# Patient Record
Sex: FEMALE | Race: WHITE | NOT HISPANIC OR LATINO | Employment: UNEMPLOYED | URBAN - METROPOLITAN AREA
[De-identification: names, ages, dates, MRNs, and addresses within clinical notes are randomized per-mention and may not be internally consistent; named-entity substitution may affect disease eponyms.]

---

## 2018-06-28 ENCOUNTER — PREP FOR SURGERY (OUTPATIENT)
Dept: GYNECOLOGIC ONCOLOGY | Facility: CLINIC | Age: 48
End: 2018-06-28

## 2018-06-28 ENCOUNTER — OFFICE VISIT (OUTPATIENT)
Dept: GYNECOLOGIC ONCOLOGY | Facility: CLINIC | Age: 48
End: 2018-06-28

## 2018-06-28 VITALS
HEART RATE: 96 BPM | TEMPERATURE: 99.2 F | WEIGHT: 150 LBS | BODY MASS INDEX: 29.45 KG/M2 | HEIGHT: 60 IN | DIASTOLIC BLOOD PRESSURE: 78 MMHG | RESPIRATION RATE: 14 BRPM | SYSTOLIC BLOOD PRESSURE: 130 MMHG | OXYGEN SATURATION: 97 %

## 2018-06-28 DIAGNOSIS — N90.1 VIN II (VULVAR INTRAEPITHELIAL NEOPLASIA II): Primary | ICD-10-CM

## 2018-06-28 DIAGNOSIS — R87.622 LGSIL PAP SMEAR OF VAGINA: ICD-10-CM

## 2018-06-28 DIAGNOSIS — Z72.0 TOBACCO ABUSE: ICD-10-CM

## 2018-06-28 PROCEDURE — 99205 OFFICE O/P NEW HI 60 MIN: CPT | Performed by: OBSTETRICS & GYNECOLOGY

## 2018-06-28 RX ORDER — BUSPIRONE HYDROCHLORIDE 7.5 MG/1
TABLET ORAL
COMMUNITY
Start: 2018-06-19

## 2018-06-28 RX ORDER — VENLAFAXINE HYDROCHLORIDE 75 MG/1
CAPSULE, EXTENDED RELEASE ORAL
COMMUNITY
Start: 2018-05-14

## 2018-06-28 RX ORDER — MONTELUKAST SODIUM 10 MG/1
TABLET ORAL
COMMUNITY
Start: 2018-06-19

## 2018-07-01 PROBLEM — R87.622 LGSIL PAP SMEAR OF VAGINA: Status: ACTIVE | Noted: 2018-07-01

## 2018-07-01 PROBLEM — Z72.0 TOBACCO ABUSE: Status: ACTIVE | Noted: 2018-07-01

## 2018-07-02 ENCOUNTER — PATIENT EDUCATION (SURGERY INSTRUCTIONS) (OUTPATIENT)
Dept: GYNECOLOGIC ONCOLOGY | Facility: CLINIC | Age: 48
End: 2018-07-02

## 2018-07-02 ENCOUNTER — TRANSCRIBE ORDERS (OUTPATIENT)
Dept: ADMINISTRATIVE | Facility: HOSPITAL | Age: 48
End: 2018-07-02

## 2018-07-02 NOTE — PATIENT INSTRUCTIONS
Outpatient Pre-op Patient Education  *See checked boxes for your instructions*    Jaelyn Emanuel  [id number]  1970    SURGEON: Dr. Montilla    Appointment  [x]  1. Your surgery has been scheduled on 7-17-18 at [facility] [place] located at [address]. You will need to be there at 12:30 PM.   [x] 2.  You have pre-admission testing (PAT) appointment on 7-16-18 at 12:45 PM.  You will need to be at hospital registration 10 minutes before that time.     [x] 3.  The registration department is located in the long hallway between the [address].       The Day(s) Before Surgery  [x] 1.  Do not drink alcohol or smoke.     [x] 2.  Do not take vitamins or aspirin one week before surgery.       [x] 3.  If you are ill on the days leading up to your surgery, please call our office.      [x] 4.  If you are using medications for diabetes, call the physician who manages these and get instructions on how they should be taken before and after surgery.    [x] 5.  Nothing to eat or drink after midnight on 7-16-18.      [x] 6.  Please make prior arrangements for someone to drive you home after your procedure        The Day of Surgery  [x] 1.  Do not eat, drink, or chew gum.     [x] 2.  On the morning of your surgery, you may take her prescription medications with a sip of water. Bring all medication with you to the surgery center. (Diabetic patients should bring insulin if instructed to do so by their diabetes managing physician).   [x] 3. Bathe or shower the morning of your surgery. Do not use powders, lotions, or creams. Deodorants are okay.     [x] 4. Wear loose, comfortable clothing.     [x] 5. Bring holders for glasses, contacts, or dentures.      [x] 6. Bring any required payment and forms, including insurance cards. Leave all money and valuables at home.        Post-surgery Instructions  [x] 1.  For the first 24 hours, rest and take periodic deep breaths to remove anesthetic agents  from your body.   [x] 2.  Follow any specific instructions relevant to your particular surgery.     [x] 3.  Limit activity to avoid stress to the surgical site.      [x] 4.  Keep all dressings dry. Shower or bathe as instructed by your doctor.     [x] 5.  Drink and eat light foods. Remain on liquids alone only if nausea and vomiting occur. Return to your regular diet gradually, as tolerance allows.    [x] 6. Avoid alcohol for at least 24 hours.      [x] 7.  Take prescription pain medication as directed and with food.      [x] 8.  Call our office after discharge from the surgery center to make your post-op appointment.        In Case of Emergency:  Call our office if you experience any of the following:  - Excessive drainage, bleeding, swelling, or redness at the incision site  - Severe pain not eased by pain medication  - Temperature above 101  - Persistent nausea or vomiting  - Skin rash or general body itching

## 2018-07-09 ENCOUNTER — PATIENT EDUCATION (SURGERY INSTRUCTIONS) (OUTPATIENT)
Dept: GYNECOLOGIC ONCOLOGY | Facility: CLINIC | Age: 48
End: 2018-07-09

## 2018-07-09 ENCOUNTER — TELEPHONE (OUTPATIENT)
Dept: GYNECOLOGIC ONCOLOGY | Facility: CLINIC | Age: 48
End: 2018-07-09

## 2018-07-09 NOTE — TELEPHONE ENCOUNTER
Phoned pt to inform her Dr. Montilla not able to do her outpt surgery scheduled for 7-17-18, need to change date, left message to call.

## 2018-07-09 NOTE — PATIENT INSTRUCTIONS
Outpatient Pre-op Patient Education  *See checked boxes for your instructions*    Jaelyn Emanuel  [id number]  1970    SURGEON: Dr. Montilla    Appointment  [x]  1. Your surgery has been scheduled on 7-16-18 at [facility] located at [address] [facility]. You will need to be there at 9:00 AM.   [x] 2.  You have pre-admission testing (PAT) appointment on 7-15-18 at 4:45 PM.  You will need to be at hospital registration 10 minutes before that time.     [x] 3.  The registration department is located in the long hallway between the [address].       The Day(s) Before Surgery  [x] 1.  Do not drink alcohol or smoke.     [x] 2.  Do not take vitamins or aspirin one week before surgery.       [x] 3.  If you are ill on the days leading up to your surgery, please call our office.      [x] 4.  If you are using medications for diabetes, call the physician who manages these and get instructions on how they should be taken before and after surgery.    [x] 5.  Nothing to eat or drink after midnight on 7-15-18.      [x] 6.  Please make prior arrangements for someone to drive you home after your procedure        The Day of Surgery  [x] 1.  Do not eat, drink, or chew gum.     [x] 2.  On the morning of your surgery, you may take her prescription medications with a sip of water. Bring all medication with you to the surgery center. (Diabetic patients should bring insulin if instructed to do so by their diabetes managing physician).   [x] 3. Bathe or shower the morning of your surgery. Do not use powders, lotions, or creams. Deodorants are okay.     [x] 4. Wear loose, comfortable clothing.     [x] 5. Bring holders for glasses, contacts, or dentures.      [x] 6. Bring any required payment and forms, including insurance cards. Leave all money and valuables at home.        Post-surgery Instructions  [x] 1.  For the first 24 hours, rest and take periodic deep breaths to remove anesthetic agents  from your body.   [x] 2.  Follow any specific instructions relevant to your particular surgery.     [x] 3.  Limit activity to avoid stress to the surgical site.      [x] 4.  Keep all dressings dry. Shower or bathe as instructed by your doctor.     [x] 5.  Drink and eat light foods. Remain on liquids alone only if nausea and vomiting occur. Return to your regular diet gradually, as tolerance allows.    [x] 6. Avoid alcohol for at least 24 hours.      [x] 7.  Take prescription pain medication as directed and with food.      [x] 8.  Call our office after discharge from the surgery center to make your post-op appointment.        In Case of Emergency:  Call our office if you experience any of the following:  - Excessive drainage, bleeding, swelling, or redness at the incision site  - Severe pain not eased by pain medication  - Temperature above 101  - Persistent nausea or vomiting  - Skin rash or general body itching

## 2018-07-09 NOTE — TELEPHONE ENCOUNTER
Received call from Hortensia with [facility] surgery scheduling, states can follow on 7-16-18, pt's case moved to 7-16-18 to follow.

## 2018-07-10 ENCOUNTER — TELEPHONE (OUTPATIENT)
Dept: GYNECOLOGIC ONCOLOGY | Facility: CLINIC | Age: 48
End: 2018-07-10

## 2018-07-10 NOTE — TELEPHONE ENCOUNTER
Orders faxed successfully to [facility] for pt's upcoming outpt procedure 7-16-18 with Dr. Montilla.

## 2018-07-15 ENCOUNTER — HOSPITAL ENCOUNTER (OUTPATIENT)
Dept: GENERAL RADIOLOGY | Facility: HOSPITAL | Age: 48
Discharge: HOME | End: 2018-07-15
Admitting: OBSTETRICS & GYNECOLOGY

## 2018-07-15 ENCOUNTER — APPOINTMENT (OUTPATIENT)
Dept: PREADMISSION TESTING | Facility: HOSPITAL | Age: 48
End: 2018-07-15

## 2018-07-15 DIAGNOSIS — N90.1 VIN II (VULVAR INTRAEPITHELIAL NEOPLASIA II): ICD-10-CM

## 2018-07-15 LAB
ALBUMIN SERPL-MCNC: 4.4 G/DL (ref 3.2–4.8)
ALBUMIN/GLOB SERPL: 1.7 G/DL (ref 1.5–2.5)
ALP SERPL-CCNC: 90 U/L (ref 25–100)
ALT SERPL W P-5'-P-CCNC: 14 U/L (ref 7–40)
ANION GAP SERPL CALCULATED.3IONS-SCNC: 7 MMOL/L (ref 3–11)
AST SERPL-CCNC: 15 U/L (ref 0–33)
BASOPHILS # BLD AUTO: 0.03 10*3/MM3 (ref 0–0.2)
BASOPHILS NFR BLD AUTO: 0.3 % (ref 0–1)
BILIRUB SERPL-MCNC: 0.9 MG/DL (ref 0.3–1.2)
BUN BLD-MCNC: 10 MG/DL (ref 9–23)
BUN/CREAT SERPL: 13.7 (ref 7–25)
CALCIUM SPEC-SCNC: 8.9 MG/DL (ref 8.7–10.4)
CHLORIDE SERPL-SCNC: 111 MMOL/L (ref 99–109)
CO2 SERPL-SCNC: 24 MMOL/L (ref 20–31)
CREAT BLD-MCNC: 0.73 MG/DL (ref 0.6–1.3)
DEPRECATED RDW RBC AUTO: 42.2 FL (ref 37–54)
EOSINOPHIL # BLD AUTO: 0.17 10*3/MM3 (ref 0–0.3)
EOSINOPHIL NFR BLD AUTO: 1.8 % (ref 0–3)
ERYTHROCYTE [DISTWIDTH] IN BLOOD BY AUTOMATED COUNT: 13.1 % (ref 11.3–14.5)
GFR SERPL CREATININE-BSD FRML MDRD: 85 ML/MIN/1.73
GLOBULIN UR ELPH-MCNC: 2.6 GM/DL
GLUCOSE BLD-MCNC: 97 MG/DL (ref 70–100)
HCG INTACT+B SERPL-ACNC: <5 MIU/ML
HCT VFR BLD AUTO: 40.6 % (ref 34.5–44)
HGB BLD-MCNC: 13.5 G/DL (ref 11.5–15.5)
IMM GRANULOCYTES # BLD: 0.02 10*3/MM3 (ref 0–0.03)
IMM GRANULOCYTES NFR BLD: 0.2 % (ref 0–0.6)
LYMPHOCYTES # BLD AUTO: 3.17 10*3/MM3 (ref 0.6–4.8)
LYMPHOCYTES NFR BLD AUTO: 33 % (ref 24–44)
MCH RBC QN AUTO: 29.3 PG (ref 27–31)
MCHC RBC AUTO-ENTMCNC: 33.3 G/DL (ref 32–36)
MCV RBC AUTO: 88.1 FL (ref 80–99)
MONOCYTES # BLD AUTO: 0.75 10*3/MM3 (ref 0–1)
MONOCYTES NFR BLD AUTO: 7.8 % (ref 0–12)
NEUTROPHILS # BLD AUTO: 5.46 10*3/MM3 (ref 1.5–8.3)
NEUTROPHILS NFR BLD AUTO: 56.9 % (ref 41–71)
PLATELET # BLD AUTO: 301 10*3/MM3 (ref 150–450)
PMV BLD AUTO: 9.4 FL (ref 6–12)
POTASSIUM BLD-SCNC: 4.1 MMOL/L (ref 3.5–5.5)
PROT SERPL-MCNC: 7 G/DL (ref 5.7–8.2)
RBC # BLD AUTO: 4.61 10*6/MM3 (ref 3.89–5.14)
SODIUM BLD-SCNC: 142 MMOL/L (ref 132–146)
WBC NRBC COR # BLD: 9.6 10*3/MM3 (ref 3.5–10.8)

## 2018-07-15 PROCEDURE — 85025 COMPLETE CBC W/AUTO DIFF WBC: CPT | Performed by: OBSTETRICS & GYNECOLOGY

## 2018-07-15 PROCEDURE — 71046 X-RAY EXAM CHEST 2 VIEWS: CPT

## 2018-07-15 PROCEDURE — 36415 COLL VENOUS BLD VENIPUNCTURE: CPT

## 2018-07-15 PROCEDURE — 93005 ELECTROCARDIOGRAM TRACING: CPT

## 2018-07-15 PROCEDURE — 84702 CHORIONIC GONADOTROPIN TEST: CPT | Performed by: OBSTETRICS & GYNECOLOGY

## 2018-07-15 PROCEDURE — 80053 COMPREHEN METABOLIC PANEL: CPT | Performed by: OBSTETRICS & GYNECOLOGY

## 2018-07-15 PROCEDURE — 93010 ELECTROCARDIOGRAM REPORT: CPT | Performed by: INTERNAL MEDICINE

## 2018-07-16 ENCOUNTER — APPOINTMENT (OUTPATIENT)
Dept: PREADMISSION TESTING | Facility: HOSPITAL | Age: 48
End: 2018-07-16

## 2018-07-16 ENCOUNTER — OUTSIDE FACILITY SERVICE (OUTPATIENT)
Dept: GYNECOLOGIC ONCOLOGY | Facility: CLINIC | Age: 48
End: 2018-07-16

## 2018-07-16 ENCOUNTER — LAB REQUISITION (OUTPATIENT)
Dept: LAB | Facility: HOSPITAL | Age: 48
End: 2018-07-16

## 2018-07-16 DIAGNOSIS — N90.1 MODERATE VULVAR DYSPLASIA: ICD-10-CM

## 2018-07-16 PROCEDURE — 56620 VULVECTOMY SIMPLE PARTIAL: CPT | Performed by: OBSTETRICS & GYNECOLOGY

## 2018-07-16 PROCEDURE — 88309 TISSUE EXAM BY PATHOLOGIST: CPT | Performed by: OBSTETRICS & GYNECOLOGY

## 2018-07-16 PROCEDURE — 88305 TISSUE EXAM BY PATHOLOGIST: CPT | Performed by: OBSTETRICS & GYNECOLOGY

## 2018-07-18 LAB
CYTO UR: NORMAL
LAB AP CASE REPORT: NORMAL
LAB AP CLINICAL INFORMATION: NORMAL
PATH REPORT.FINAL DX SPEC: NORMAL
PATH REPORT.GROSS SPEC: NORMAL

## 2018-07-25 ENCOUNTER — OFFICE VISIT (OUTPATIENT)
Dept: GYNECOLOGIC ONCOLOGY | Facility: CLINIC | Age: 48
End: 2018-07-25

## 2018-07-25 VITALS
OXYGEN SATURATION: 96 % | BODY MASS INDEX: 29.29 KG/M2 | DIASTOLIC BLOOD PRESSURE: 73 MMHG | SYSTOLIC BLOOD PRESSURE: 125 MMHG | HEART RATE: 84 BPM | RESPIRATION RATE: 12 BRPM | WEIGHT: 150 LBS | TEMPERATURE: 97.6 F

## 2018-07-25 DIAGNOSIS — Z98.890 POST-OPERATIVE STATE: Primary | ICD-10-CM

## 2018-07-25 PROCEDURE — 99024 POSTOP FOLLOW-UP VISIT: CPT | Performed by: OBSTETRICS & GYNECOLOGY

## 2018-07-25 RX ORDER — DOCUSATE SODIUM 250 MG
250 CAPSULE ORAL DAILY
Qty: 60 CAPSULE | Refills: 2 | Status: SHIPPED | OUTPATIENT
Start: 2018-07-25

## 2018-07-25 NOTE — PROGRESS NOTES
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 woman who presents for a post operative evaluation status post wide local excision of vulva and biopsies performed on 7/16/18      Surgery and hospital course were uncomplicated.  Today, patient notes normal bowel and bladder function.  She continues to have pain with her incision and states that she has not used interdry because she was unsure how to use it.     Past Medical History, Past Surgical History, Social History, Family History have been reviewed and are without significant changes except as mentioned.    Review of Systems   All other systems were reviewed and are negative except as mentioned above.    Medications:  The current medication list was reviewed in the EMR    ALLERGIES:  No Known Allergies        /73   Pulse 84   Temp 97.6 °F (36.4 °C) (Temporal Artery )   Resp 12   Wt 68 kg (150 lb)   SpO2 96%   BMI 29.29 kg/m²        Physical Exam  Constitutional:  Patient is a pleasant woman in no acute distress.  Gastrointestinal: Abdomen is soft and appropriately tender.  There is no mass palpated.  There is no rebound or guarding.  Extremities:  Bilateral lower extremities are non-tender.  Gynecologic: Deep sutures visible on perineum, superficial skin separation over posterior vulva. Moderate amount of exudate noted.     PATHOLOGY:  1. LEFT ANTERIOR VULVAR PIGMENTED LESION, BIOPSY:  Benign lentigo.   2. LEFT ANTERIOR VULVAR PERICLITORAL BIOPSY:  Changes suggestive of HPV.   Negative for dysplasia and neoplasm.  3. 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  Margins free of dysplasia.   Focal benign squamous inclusion cyst.        ASSESSMENT/PLAN:  Jaelyn Emanuel returns for a post-operative evaluation today.  All pathology reports were given to patient. We  discussed exam findings and I reviewed postoperative care of her incisions including using interdry. She has not been performing wound care as instructed.      Overall, the patient is very pleased with her care.  I recommended continuation of post operative precautions as discussed.     She is to follow up in 1 week to reevaluate incision.     Note: Speech recognition transcription software was used to dictate portions of this document.  An attempt at proofreading has been made though minor errors in transcription may still be present.  Please do not hesitate to call our office with any questions.    Patient was seen and examined with Dr. Gardner,  resident, who performed portions of the examination and documentation for this patient's care under my direct supervision.    Jane Montilla MD  07/28/18  8:49 AM

## 2018-08-01 ENCOUNTER — OFFICE VISIT (OUTPATIENT)
Dept: GYNECOLOGIC ONCOLOGY | Facility: CLINIC | Age: 48
End: 2018-08-01

## 2018-08-01 VITALS
SYSTOLIC BLOOD PRESSURE: 123 MMHG | OXYGEN SATURATION: 96 % | HEART RATE: 91 BPM | TEMPERATURE: 98.9 F | DIASTOLIC BLOOD PRESSURE: 77 MMHG | BODY MASS INDEX: 29.1 KG/M2 | WEIGHT: 149 LBS | RESPIRATION RATE: 16 BRPM

## 2018-08-01 DIAGNOSIS — Z98.890 POST-OPERATIVE STATE: Primary | ICD-10-CM

## 2018-08-01 PROCEDURE — 99024 POSTOP FOLLOW-UP VISIT: CPT | Performed by: OBSTETRICS & GYNECOLOGY

## 2018-08-02 NOTE — PROGRESS NOTES
Jaelyn Emanuel  [id number]  1970      Reason for Visit:  Vulvar HSIL, Postoperative evaluation, skin dehiscence     History of Present Illness:  Patient is a very pleasant 47 y.o. woman who presents for continued post operative evaluation status post wide local excision of vulva and biopsies performed on 7/16/18      After last visit, pt states that she has been using interdry and it helps with drainage at night, though she continues to have drainage during the day. She does endorse feeling significant irritation. She thinks she is low on interdry.      Past Medical History, Past Surgical History, Social History, Family History have been reviewed and are without significant changes except as mentioned.    Review of Systems   All other systems were reviewed and are negative except as mentioned above.    Medications:  The current medication list was reviewed in the EMR    ALLERGIES:  No Known Allergies        /77   Pulse 91   Temp 98.9 °F (37.2 °C) (Temporal Artery )   Resp 16   Wt 67.6 kg (149 lb)   SpO2 96%   BMI 29.10 kg/m²        Physical Exam  Constitutional:  Patient is a pleasant woman in no acute distress.  Gastrointestinal: Abdomen is soft and appropriately tender.  There is no mass palpated.  There is no rebound or guarding.  Extremities:  Bilateral lower extremities are non-tender.  Gynecologic: Deep sutures visible on perineum, superficial skin separation over posterior vulva and left labia. Moderate amount of exudate noted. Exposed sutures removed.     PATHOLOGY:  1. LEFT ANTERIOR VULVAR PIGMENTED LESION, BIOPSY:  Benign lentigo.   2. LEFT ANTERIOR VULVAR PERICLITORAL BIOPSY:  Changes suggestive of HPV.   Negative for dysplasia and neoplasm.  3. 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  Margins free of dysplasia.   Focal benign squamous inclusion cyst.        ASSESSMENT/PLAN:  Jaelyn Emanuel returns for a  post-operative evaluation today s/p WLE that has been complicated by superficial skin dehiscence now with increased irritation.       We discussed exam findings and encouraged continued postoperative care of her incisions including using interdry.   I believe her irritation will improve now that the exposed sutures have been removed. Her wound appears to be healing, I suspect this will just take more time. Patient is comfortable with her wound care and level of pain and is agreeable with 2 week follow up. She was given additional supply of interdry. I instructed her to call clinic if she has any concerns prior to her follow up.     She is to follow up in 2 week to reevaluate incision.     Note: Speech recognition transcription software was used to dictate portions of this document.  An attempt at proofreading has been made though minor errors in transcription may still be present.  Please do not hesitate to call our office with any questions.    Patient was seen and examined with Dr. Gardner,  resident, who performed portions of the examination and documentation for this patient's care under my direct supervision.    Jane Montilla MD  08/02/18  6:35 PM

## 2018-08-15 ENCOUNTER — OFFICE VISIT (OUTPATIENT)
Dept: GYNECOLOGIC ONCOLOGY | Facility: CLINIC | Age: 48
End: 2018-08-15

## 2018-08-15 ENCOUNTER — TELEPHONE (OUTPATIENT)
Dept: GYNECOLOGIC ONCOLOGY | Facility: CLINIC | Age: 48
End: 2018-08-15

## 2018-08-15 VITALS
RESPIRATION RATE: 16 BRPM | DIASTOLIC BLOOD PRESSURE: 74 MMHG | WEIGHT: 150 LBS | SYSTOLIC BLOOD PRESSURE: 123 MMHG | TEMPERATURE: 98 F | HEART RATE: 84 BPM | BODY MASS INDEX: 29.29 KG/M2 | OXYGEN SATURATION: 96 %

## 2018-08-15 DIAGNOSIS — Z98.890 POST-OPERATIVE STATE: Primary | ICD-10-CM

## 2018-08-15 PROCEDURE — 99024 POSTOP FOLLOW-UP VISIT: CPT | Performed by: OBSTETRICS & GYNECOLOGY

## 2018-08-15 NOTE — PROGRESS NOTES
Jaelyn Emanuel  [id number]  1970    Reason for Visit:  Vulvar HSIL, postoperative evaluation, skin dehiscence     History of Present Illness:  Patient is a very pleasant 47 y.o. woman who presents for continued post operative evaluation status post wide local excision of vulva and biopsies performed on 7/16/18      Patient is doing well. She has some continued itching and irritation, but overall both have improved.       Past Medical History, Past Surgical History, Social History, Family History have been reviewed and are without significant changes except as mentioned.    Review of Systems   All other systems were reviewed and are negative except as mentioned above.    Medications:  The current medication list was reviewed in the EMR    ALLERGIES:  No Known Allergies        /74   Pulse 84   Temp 98 °F (36.7 °C) (Temporal Artery )   Resp 16   Wt 68 kg (150 lb)   SpO2 96%   BMI 29.29 kg/m²        Physical Exam  Constitutional:  Patient is a pleasant woman in no acute distress.  Gastrointestinal: Abdomen is soft and appropriately tender.  There is no mass palpated.  There is no rebound or guarding.  Extremities:  Bilateral lower extremities are non-tender.  Gynecologic: Sutures visible deep to posterior fourchette, superficial skin separation over posterior vulva. No erythema. Minimal exudate noted. Exposed sutures removed.     PATHOLOGY:  1. LEFT ANTERIOR VULVAR PIGMENTED LESION, BIOPSY:  Benign lentigo.   2. LEFT ANTERIOR VULVAR PERICLITORAL BIOPSY:  Changes suggestive of HPV.   Negative for dysplasia and neoplasm.  3. 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  Margins free of dysplasia.   Focal benign squamous inclusion cyst.        ASSESSMENT/PLAN:  Jaelyn Emanuel returns for a post-operative evaluation today s/p WLE of vulva that has been complicated by superficial skin dehiscence.       Exam improved from last visit on  8/1/18. Expect healing will be complete in near future. Patient is comfortable with her wound care and has minimal discomfort. I instructed her to call clinic if she has any concerns prior to her follow up in November with Radha Russ.     Follow-up in 6 months for exam.  Patient would like to keep her care with Radha Russ.      Note: Speech recognition transcription software was used to dictate portions of this document.  An attempt at proofreading has been made though minor errors in transcription may still be present.  Please do not hesitate to call our office with any questions.    Patient was seen and examined with Dr. Sanchez,  resident, who performed portions of the examination and documentation for this patient's care under my direct supervision.    Jane Montilla MD  08/15/18  10:24 AM

## 2018-08-15 NOTE — TELEPHONE ENCOUNTER
----- Message from Debra Be sent at 8/15/2018  3:28 PM EDT -----  Regarding: [facility] - FOLLOW UP QUESTIONS  Contact: [phone]  PATIENT HAS SOME FOLLOW UP QUESTIONS FROM HER APPOINTMENT TODAY, SHE WOULD LIKE A CALL BACK.  Returned pt's call, states when she sits a certain or moves a certain way, it feels like something is pulling apart. I explained to her that it will feel that way due to the area of her procedure.  PT v/u, also says that she still has some d/c and wants to know how long that will last.  I told her until it heals.